# Patient Record
Sex: FEMALE | Race: WHITE | NOT HISPANIC OR LATINO
[De-identification: names, ages, dates, MRNs, and addresses within clinical notes are randomized per-mention and may not be internally consistent; named-entity substitution may affect disease eponyms.]

---

## 2021-03-31 DIAGNOSIS — Z78.9 OTHER SPECIFIED HEALTH STATUS: ICD-10-CM

## 2021-03-31 RX ORDER — LEVOTHYROXINE SODIUM 0.17 MG/1
175 TABLET ORAL
Refills: 0 | Status: ACTIVE | COMMUNITY

## 2021-04-01 ENCOUNTER — APPOINTMENT (OUTPATIENT)
Dept: CARDIOLOGY | Facility: CLINIC | Age: 51
End: 2021-04-01
Payer: COMMERCIAL

## 2021-04-01 ENCOUNTER — NON-APPOINTMENT (OUTPATIENT)
Age: 51
End: 2021-04-01

## 2021-04-01 VITALS
HEIGHT: 67 IN | OXYGEN SATURATION: 96 % | WEIGHT: 262 LBS | TEMPERATURE: 97.2 F | SYSTOLIC BLOOD PRESSURE: 110 MMHG | HEART RATE: 52 BPM | DIASTOLIC BLOOD PRESSURE: 74 MMHG | BODY MASS INDEX: 41.12 KG/M2 | RESPIRATION RATE: 16 BRPM

## 2021-04-01 DIAGNOSIS — R00.1 BRADYCARDIA, UNSPECIFIED: ICD-10-CM

## 2021-04-01 DIAGNOSIS — R94.31 ABNORMAL ELECTROCARDIOGRAM [ECG] [EKG]: ICD-10-CM

## 2021-04-01 DIAGNOSIS — M79.89 OTHER SPECIFIED SOFT TISSUE DISORDERS: ICD-10-CM

## 2021-04-01 DIAGNOSIS — Z13.6 ENCOUNTER FOR SCREENING FOR CARDIOVASCULAR DISORDERS: ICD-10-CM

## 2021-04-01 DIAGNOSIS — R06.00 DYSPNEA, UNSPECIFIED: ICD-10-CM

## 2021-04-01 DIAGNOSIS — E66.01 MORBID (SEVERE) OBESITY DUE TO EXCESS CALORIES: ICD-10-CM

## 2021-04-01 PROCEDURE — 99205 OFFICE O/P NEW HI 60 MIN: CPT

## 2021-04-01 PROCEDURE — 99072 ADDL SUPL MATRL&STAF TM PHE: CPT

## 2021-04-01 PROCEDURE — 93000 ELECTROCARDIOGRAM COMPLETE: CPT

## 2021-04-10 PROBLEM — R06.00 EXERTIONAL DYSPNEA: Status: ACTIVE | Noted: 2021-04-10

## 2021-04-10 PROBLEM — M79.89 SWELLING OF RIGHT LOWER EXTREMITY: Status: ACTIVE | Noted: 2021-03-31

## 2021-04-10 PROBLEM — E66.01 MORBID OBESITY: Status: ACTIVE | Noted: 2021-04-10

## 2021-04-10 PROBLEM — Z13.6 SCREENING FOR CARDIOVASCULAR CONDITION: Status: ACTIVE | Noted: 2021-04-10

## 2021-04-10 NOTE — PHYSICAL EXAM
[General Appearance - Well Developed] : well developed [Normal Appearance] : normal appearance [Well Groomed] : well groomed [General Appearance - Well Nourished] : well nourished [No Deformities] : no deformities [General Appearance - In No Acute Distress] : no acute distress [Normal Conjunctiva] : the conjunctiva exhibited no abnormalities [Eyelids - No Xanthelasma] : the eyelids demonstrated no xanthelasmas [Normal Oral Mucosa] : normal oral mucosa [No Oral Pallor] : no oral pallor [No Oral Cyanosis] : no oral cyanosis [Normal Jugular Venous A Waves Present] : normal jugular venous A waves present [Normal Jugular Venous V Waves Present] : normal jugular venous V waves present [No Jugular Venous Witt A Waves] : no jugular venous witt A waves [Heart Rate And Rhythm] : heart rate and rhythm were normal [Heart Sounds] : normal S1 and S2 [Murmurs] : no murmurs present [Respiration, Rhythm And Depth] : normal respiratory rhythm and effort [Exaggerated Use Of Accessory Muscles For Inspiration] : no accessory muscle use [Auscultation Breath Sounds / Voice Sounds] : lungs were clear to auscultation bilaterally [Abdomen Soft] : soft [Abdomen Tenderness] : non-tender [Abdomen Mass (___ Cm)] : no abdominal mass palpated [Abnormal Walk] : normal gait [Gait - Sufficient For Exercise Testing] : the gait was sufficient for exercise testing [Nail Clubbing] : no clubbing of the fingernails [Cyanosis, Localized] : no localized cyanosis [Petechial Hemorrhages (___cm)] : no petechial hemorrhages [] : no ischemic changes

## 2021-04-10 NOTE — REASON FOR VISIT
[FreeTextEntry1] : I had the pleasure of evaluating your patient, Ms. Fauzia Ahumada for cardiovascular evaluation.  She last saw me several years ago in April 2016.  She is now a 51 year old woman with morbid obesity (BMI 41-42), hypothyroidism, and chronic lower extremity swelling who now presents with complaints of exertional dyspnea and bradycardia to low 50s bpm.  \par \par She otherwise denies having chest pain, palpitations, orthopnea/PND, edema, dizziness, syncope/presyncope. She has not had a prior cardiac assessment.  Denies recent illnesses or other toxic habits.  No significant FH early CAD.\par \par 12-lead ECG notes Sinus bradycardia to 52 bpm, normal axis, normal intervals.  BPs 110.74.  Physical examination was unremarkable. Appears euvolemic on exam.\par \par At this time, will recommend:\par \par 1. Lifestyle modifications. We spoke at length about necessary dietary and lifestyle modifications. This includes improved dietary modifications including salt restriction, decreased fat and processed food intake, and smoking cessation.\par 2. Stress echocardiogram to assess cardiac structure/function, exercise capacity, chronotropic competence, and for ischemia.\par 3. F/U in office after testing to review results and updated recommendations.\par \par Thank you again for entrusting her care with me. I will be in touch.\par \par Warmest regards,\par Josue Bardales\par

## 2021-05-13 ENCOUNTER — APPOINTMENT (OUTPATIENT)
Dept: CV DIAGNOSITCS | Facility: HOSPITAL | Age: 51
End: 2021-05-13
Payer: COMMERCIAL

## 2021-05-13 ENCOUNTER — OUTPATIENT (OUTPATIENT)
Dept: OUTPATIENT SERVICES | Facility: HOSPITAL | Age: 51
LOS: 1 days | End: 2021-05-13

## 2021-05-13 DIAGNOSIS — R94.31 ABNORMAL ELECTROCARDIOGRAM [ECG] [EKG]: ICD-10-CM

## 2021-05-13 DIAGNOSIS — R00.1 BRADYCARDIA, UNSPECIFIED: ICD-10-CM

## 2021-05-13 LAB — HCG UR QL: NEGATIVE — SIGNIFICANT CHANGE UP

## 2021-05-13 PROCEDURE — 93351 STRESS TTE COMPLETE: CPT | Mod: 26,GC

## 2021-05-13 PROCEDURE — 93320 DOPPLER ECHO COMPLETE: CPT | Mod: 26,GC

## 2021-05-13 PROCEDURE — 93325 DOPPLER ECHO COLOR FLOW MAPG: CPT | Mod: 26,GC

## 2021-05-14 ENCOUNTER — TRANSCRIPTION ENCOUNTER (OUTPATIENT)
Age: 51
End: 2021-05-14